# Patient Record
(demographics unavailable — no encounter records)

---

## 2024-10-22 NOTE — DISCUSSION/SUMMARY
[EKG obtained to assist in diagnosis and management of assessed problem(s)] : EKG obtained to assist in diagnosis and management of assessed problem(s) [FreeTextEntry1] : Pt is a 19 y/o F with vasovagal syncope  Given pt's symptoms will check del cid monitor to assess for ectopy.  Advised adequate hydration.  Drug use, OTC medication use, caffeine consumption reviewed  Will check transthoracic echocardiogram to evaluate left ventricular function and assess for any structural abnormalities  will perform ETT to assess patient's current cardiac reserve to incremental activity and check for provocable ECG changes.  check CUS recent labs reviewed  Pt should maintain good hydration, drinking 8-10 cups of water daily.  Caffeinated beverages should be avoided.  Salt intake should be increased to help with symptoms and in situations which require prolonged standing or medical procedures, unless pt develops HTN in the future.  Pt advised to try and avoid triggers. Compression stockings are also recommended.   The described plan was discussed with the pt and mother.  All questions and concerns were addressed to the best of my knowledge.

## 2024-10-22 NOTE — HISTORY OF PRESENT ILLNESS
[FreeTextEntry1] : Pt is a 19 y/o F who presents today for initial evaluation.  Over the past few years she has had syncopal episodes.  Last syncopal episode was this past summer - she was at the beach, did not eat, mentions bad sunburn, drank a little bit of water, was sitting down felt dizzy, saw "black dots" and passed out.   About 2 years prior she was standing holding boxes, cut her finger and passed out.   About 1 month ago she was driving to school, felt anxious about the drive, did not want to be driving, felt SOB with palpitations, she pulled over, did not pass out.    She was seen by PCP, labs done She is accompanied by her mother   PCP Dr Mayelin Bustamante additional PMH: thalassemia minor Previous hospitalizations: none  Previous surgeries: none Family hx: no SCD, father SVT/NSVT/AF Smoking status: never social ETOH no drug use  Current exercise: none Daily water intake: 40 oz Daily caffeine intake: not consistent Previous cardiac testing: none

## 2024-10-22 NOTE — REVIEW OF SYSTEMS
[Syncope] : syncope [Negative] : Heme/Lymph [Palpitations] : palpitations [SOB] : no shortness of breath [Dyspnea on exertion] : not dyspnea during exertion [Chest Discomfort] : no chest discomfort [Lower Ext Edema] : no extremity edema [Leg Claudication] : no intermittent leg claudication [Orthopnea] : no orthopnea [PND] : no PND

## 2025-04-28 NOTE — HISTORY OF PRESENT ILLNESS
[FreeTextEntry1] : Pt is a 19 y/o F who presents today for f/u.  She has PMH vasovagal syncope About 2022 she was standing holding boxes, cut her finger and passed out.   summer 2024 - she was at the beach, did not eat, mentions bad sunburn, drank a little bit of water, was sitting down felt dizzy, saw "black dots" and passed out.   2024 she was driving to school, felt anxious about the drive, did not want to be driving, felt SOB with palpitations, she pulled over, did not pass out.     Todays OV 04/28/2025: Last mon she felt SOB and palpitations.  She went to [facility] ED.  She states that she went to "psych ED" and was discharged after 9 hours.  She was still feeling palpitations and SOB.  She went to her PCP, labs were done (results unavailable to me).  She continued to feel same way.  Thurs she felt "a tiny pain in her chest" - she then went to [facility] - she was told that her K was low and received supplements.     After last OV her del cid was lost in the mail.   She is accompanied by her mother  ETT 02/2025 13.4 METS, no ischemic changes TTE 02/2025 EF 56%, no valvular abnormalities CUS 02/2025 wnl   PCP Dr Mayelin Bustamante additional PMH: thalassemia minor Previous hospitalizations: none  Previous surgeries: none Family hx: no SCD, father SVT/NSVT/AF Smoking status: never social ETOH no drug use  Current exercise: none Daily water intake: 40 oz Daily caffeine intake: not consistent

## 2025-04-28 NOTE — DISCUSSION/SUMMARY
[EKG obtained to assist in diagnosis and management of assessed problem(s)] : EKG obtained to assist in diagnosis and management of assessed problem(s) [FreeTextEntry1] : Pt is a 17 y/o F p/w palpitations  Given pt's symptoms will check del cid monitor to assess for ectopy.  Advised adequate hydration.  Drug use, OTC medication use, caffeine consumption reviewed  Del Cid placed same day as office visit.  recent labs reviewed  Pt should maintain good hydration, drinking 8-10 cups of water daily.  Caffeinated beverages should be avoided.  Salt intake should be increased to help with symptoms and in situations which require prolonged standing or medical procedures, unless pt develops HTN in the future.  Pt advised to try and avoid triggers. Compression stockings are also recommended.   The described plan was discussed with the pt and mother.  All questions and concerns were addressed to the best of my knowledge.